# Patient Record
Sex: MALE | Race: WHITE | NOT HISPANIC OR LATINO | URBAN - METROPOLITAN AREA
[De-identification: names, ages, dates, MRNs, and addresses within clinical notes are randomized per-mention and may not be internally consistent; named-entity substitution may affect disease eponyms.]

---

## 2020-02-18 ENCOUNTER — EMERGENCY (EMERGENCY)
Facility: HOSPITAL | Age: 25
LOS: 1 days | Discharge: HOME | End: 2020-02-18
Attending: EMERGENCY MEDICINE | Admitting: EMERGENCY MEDICINE
Payer: COMMERCIAL

## 2020-02-18 VITALS
TEMPERATURE: 99 F | OXYGEN SATURATION: 98 % | WEIGHT: 175.05 LBS | RESPIRATION RATE: 16 BRPM | SYSTOLIC BLOOD PRESSURE: 117 MMHG | HEART RATE: 109 BPM | HEIGHT: 70 IN | DIASTOLIC BLOOD PRESSURE: 72 MMHG

## 2020-02-18 PROCEDURE — 73610 X-RAY EXAM OF ANKLE: CPT

## 2020-02-18 PROCEDURE — 99284 EMERGENCY DEPT VISIT MOD MDM: CPT | Mod: 25

## 2020-02-18 PROCEDURE — 73610 X-RAY EXAM OF ANKLE: CPT | Mod: 26,RT

## 2020-02-18 PROCEDURE — 73590 X-RAY EXAM OF LOWER LEG: CPT

## 2020-02-18 PROCEDURE — 99284 EMERGENCY DEPT VISIT MOD MDM: CPT

## 2020-02-18 PROCEDURE — 73590 X-RAY EXAM OF LOWER LEG: CPT | Mod: 26,RT

## 2020-02-18 RX ORDER — IBUPROFEN 200 MG
600 TABLET ORAL ONCE
Refills: 0 | Status: COMPLETED | OUTPATIENT
Start: 2020-02-18 | End: 2020-02-18

## 2020-02-18 RX ADMIN — Medication 600 MILLIGRAM(S): at 22:46

## 2020-02-18 NOTE — ED PROVIDER NOTE - NSFOLLOWUPINSTRUCTIONS_ED_ALL_ED_FT
Achilles Tendon Tear     The Achilles tendon is a cord-like band that connects the muscles of your lower leg (calf) to your heel. The Achilles tendon is the most common site of tendon tearing (rupture).  What are the causes?  This condition may be caused by:  Stress from a sudden stretching of the tendon. For example, this may occur when you land from a jump or when your heel drops down into a hole on uneven ground.A hard, direct hit to the tendon.Pushing off your foot forcefully, such as when sprinting, jumping, or changing direction while running.What increases the risk?  You are more likely to develop this condition if you:  Are a runner.Play sports that involve sprinting, running, or jumping.Play contact sports.Have a weak Achilles tendon. Tendons can weaken from aging, repeat injuries, and chronic tendinitis.Are male.Are 30–55 years of age.Take a type of antibiotic medicine called quinolones.What are the signs or symptoms?  Symptoms of this condition include:  Hearing a noise, like a pop or a snap, at the time of injury.Severe, sudden pain in the back of the ankle.Swelling and bruising.Inability to actively point your toes down.Pain when standing or walking.A feeling of giving way when you step on the affected foot.How is this diagnosed?  This condition is usually diagnosed based on a physical exam.  You may also have imaging tests, such as:  Ultrasound.X-rays.MRI.How is this treated?  This condition may be treated with:  Rest.Ice applied to the area.Pain medicine.Crutches.A cast, splint, or other device to keep the ankle from moving (keep it immobilized).Heel wedges to reduce the stretch on your tendon as it heals.Surgery. This option may depend on your age and your activity level.Follow these instructions at home:  Medicines     Take over-the-counter and prescription medicines only as told by your health care provider.Ask your health care provider if the medicine prescribed to you:  Requires you to avoid driving or using heavy machinery.Can cause constipation. You may need to take these actions to prevent or treat constipation:  Drink enough fluid to keep your urine pale yellow.Take over-the-counter or prescription medicines.Eat foods that are high in fiber, such as beans, whole grains, and fresh fruits and vegetables.Limit foods that are high in fat and processed sugars, such as fried or sweet foods.If you have a cast:     Do not stick anything inside the cast to scratch your skin. Doing that increases your risk of infection.You may put lotion on dry skin around the edges of the cast. Do not put lotion on the skin underneath it.If you have a splint or brace:     Wear it as told by your health care provider. Remove it only as told by your health care provider.Loosen it if your toes tingle, become numb, or turn cold and blue.If you have a cast, splint, or brace:     Keep it clean and dry.Check the skin around it every day. Tell your health care provider about any concerns.Ask your health care provider when it is safe to drive if you have it on a leg or foot that you use for driving.Bathing     Do not take baths, swim, or use a hot tub until your health care provider approves. Ask your health care provider if you may take showers. You may only be allowed to take sponge baths.If the cast, splint, or brace is not waterproof:  Do not let it get wet.Cover it with a watertight covering when you take a bath or shower.Managing pain, stiffness, and swelling        If directed, put ice on the injured area.  If you have a removable splint or brace, remove it as told by your health care provider.Put ice in a plastic bag.Place a towel between your skin and the bag or between your cast and the bag.Leave the ice on for 20 minutes, 2–3 times a day.Move your toes often to avoid stiffness and to lessen swelling.Raise (elevate) the injured area above the level of your heart while you are sitting or lying down.Activity     Return to your normal activities as told by your health care provider. Ask your health care provider what activities are safe for you.Do not use the injured leg to support your body weight until your health care provider says that you can. Use crutches as told by your health care provider.Ask your health care provider which exercises are safe for you.General instructions     Do not put pressure on any part of a cast or splint until it is fully hardened. This may take several hours.Do not use any products that contain nicotine or tobacco, such as cigarettes, e-cigarettes, and chewing tobacco. These can delay healing. If you need help quitting, ask your health care provider.Use heel wedges if told by your health care provider.Keep all follow-up visits as told by your health care provider. This is important.How is this prevented?  Do exercises exactly as told by your health care provider and adjust them as directed.Warm up and stretch before being active.Cool down and stretch after being active.Rest between periods of activity.Use equipment that fits you.Contact a health care provider if you have:  More pain and swelling.Pain that is not controlled with medicines.New symptoms.Symptoms that get worse.Problems moving your toes or foot.Warmth in your foot.A fever.Summary  An Achilles tendon tear is an injury that involves a tear in this tendon.This injury typically occurs in runners or athletes who play sports that involve sprinting, running, or jumping.An Achilles tendon tear causes sudden severe pain in your ankle and an inability to point your foot down.Treatment for this injury may include rest, ice, and pain medicines. In some cases, surgery may be needed.This information is not intended to replace advice given to you by your health care provider. Make sure you discuss any questions you have with your health care provider.    Document Released: 12/18/2006 Document Revised: 10/17/2019 Document Reviewed: 07/11/2019  [employer] Patient Education © 2020 [employer]

## 2020-02-18 NOTE — ED PROVIDER NOTE - PATIENT PORTAL LINK FT
You can access the FollowMyHealth Patient Portal offered by [facility] by registering at the following website: [email or web address]. By joining [employer]’s FollowMyHealth portal, you will also be able to view your health information using other applications (apps) compatible with our system.

## 2020-02-18 NOTE — ED PROVIDER NOTE - CARE PROVIDER_API CALL
Darion Gottlieb)  Orthopaedic Surgery Surgery  [address] [place] [address]  Phone: [phone]  Fax: [phone]  Follow Up Time:

## 2020-02-18 NOTE — ED ADULT NURSE NOTE - NSIMPLEMENTINTERV_GEN_ALL_ED
Implemented All Universal Safety Interventions:  [place] to call system. Call bell, personal items and telephone within reach. Instruct patient to call for assistance. Room bathroom lighting operational. Non-slip footwear when patient is off stretcher. Physically safe environment: no spills, clutter or unnecessary equipment. Stretcher in lowest position, wheels locked, appropriate side rails in place.

## 2020-02-18 NOTE — ED ADULT NURSE NOTE - OBJECTIVE STATEMENT
24 y.o M a&ox4 walked in from front traige c.o R ankle pain. Pt state "I was running in a soccer game, made a qucik turn and heard a pop in my leg." + R ankle pain. pain to calf radiating down to achilles area. able to move toes. no numbness, tingling, weakness, unable to bear weight on R ankle.

## 2020-02-18 NOTE — ED PROVIDER NOTE - PHYSICAL EXAMINATION
CONSTITUTIONAL: Well-appearing; well-nourished; in no apparent distress.   HEAD: Normocephalic; atraumatic.   EYES:  conjunctiva and sclera clear  ENT: normal nose; no rhinorrhea;  NECK: Supple; full ROM  RESPIRATORY: Breathing easily; no resp difficulty  EXT: No cyanosis or edema; diffuse tenderness over achilles tendon. no tenderness over gastroc. both malleoli non tender to palpation. pulses palpable. burns test partially intact  SKIN: Normal for age and race; warm; dry; good turgor; no apparent lesions or rash.   NEURO: A & O x 3; face symmetric; grossly unremarkable.   PSYCHOLOGICAL: The patient’s mood and manner are appropriate.

## 2020-02-18 NOTE — ED PROVIDER NOTE - OBJECTIVE STATEMENT
23yo M c.o R ankle pain. Pt state "I was running in a soccer game, made a qucik turn and heard a pop in my leg." +radiating down to achilles area. able to move toes. no numbness, tingling, weakness, unable to bear weight on R ankle. no prior injuries. no pmh/sh. sharp pain. did not take anything for the pain prior to coming.

## 2020-02-19 VITALS
OXYGEN SATURATION: 97 % | HEART RATE: 99 BPM | RESPIRATION RATE: 19 BRPM | SYSTOLIC BLOOD PRESSURE: 119 MMHG | TEMPERATURE: 98 F | DIASTOLIC BLOOD PRESSURE: 78 MMHG

## 2020-02-19 NOTE — CONSULT NOTE ADULT - SUBJECTIVE AND OBJECTIVE BOX
Pt Name: MARY CARMEN PARISI  MRN: [id number]    The patient was seen and examined. 25yo with eccentric loading injury to RLE when he felt a popping sound and felt like someone kicked him in back of heel. Patient unable to bear weight on RLE afterwards and came into [facility] ED. Ortho consulted for recs. Denies any trauma to RLE.      ROS is otherwise negative.  PAST MEDICAL & SURGICAL HISTORY:  No pertinent past medical history  No significant past surgical history      Allergies: NKDA    Medications:      Social History:  Ambulation: Walking independently    PHYSICAL EXAM:    T(C): 36.6 (02-19-20 @ 00:30), Max: 37 (02-18-20 @ 21:49)  HR: 99 (02-19-20 @ 00:30) (99 - 109)  BP: 119/78 (02-19-20 @ 00:30) (117/72 - 119/78)  RR: 19 (02-19-20 @ 00:30) (16 - 19)  SpO2: 97% (02-19-20 @ 00:30) (97% - 98%)  Wt(kg): --    Gen: well developed, well nourished, comfortable  Affected extremity: RLE       Motor: firing GS/TA/EHL, weakness to plantarflexion  Barahona's sign +      Sensation: SILT sp/dp/santos/saph/t      wwp <2 sec cap refill     Labs:    XR of R ankle shows no fx or dislocation         A/P  Pt is a 25yo Male s/p R achilles tendon rupture   -NWB RLE in plantar flexion splint and crutches  -Splint care  -Do not get splint wet  -f/u with Dr. Gottlieb outpatient   d/w attending on call Dr. Gottlieb

## 2020-02-19 NOTE — ED ADULT NURSE REASSESSMENT NOTE - NS ED NURSE REASSESS COMMENT FT1
pt instructed on safe use of crutches. Demonstrated safe use. D/c instructions provided. Pt needs met.
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
## 2020-02-24 DIAGNOSIS — S86.011A STRAIN OF RIGHT ACHILLES TENDON, INITIAL ENCOUNTER: ICD-10-CM

## 2020-02-24 DIAGNOSIS — M25.571 PAIN IN RIGHT ANKLE AND JOINTS OF RIGHT FOOT: ICD-10-CM
